# Patient Record
Sex: FEMALE | Race: WHITE | NOT HISPANIC OR LATINO | URBAN - METROPOLITAN AREA
[De-identification: names, ages, dates, MRNs, and addresses within clinical notes are randomized per-mention and may not be internally consistent; named-entity substitution may affect disease eponyms.]

---

## 2019-07-18 ENCOUNTER — APPOINTMENT (RX ONLY)
Dept: URBAN - METROPOLITAN AREA CLINIC 44 | Facility: CLINIC | Age: 54
Setting detail: DERMATOLOGY
End: 2019-07-18

## 2019-07-18 ENCOUNTER — APPOINTMENT (RX ONLY)
Dept: URBAN - METROPOLITAN AREA CLINIC 45 | Facility: CLINIC | Age: 54
Setting detail: DERMATOLOGY
End: 2019-07-18

## 2019-07-18 DIAGNOSIS — L71.8 OTHER ROSACEA: ICD-10-CM

## 2019-07-18 DIAGNOSIS — L21.8 OTHER SEBORRHEIC DERMATITIS: ICD-10-CM

## 2019-07-18 PROBLEM — R23.8 OTHER SKIN CHANGES: Status: ACTIVE | Noted: 2019-07-18

## 2019-07-18 PROCEDURE — 99203 OFFICE O/P NEW LOW 30 MIN: CPT

## 2019-07-18 PROCEDURE — ? OTHER

## 2019-07-18 PROCEDURE — ? PRESCRIPTION

## 2019-07-18 PROCEDURE — ? COUNSELING

## 2019-07-18 RX ORDER — SODIUM SULFACETAMIDE AND SULFUR 80; 40 MG/ML; MG/ML
1 LOTION TOPICAL QD
Qty: 1 | Refills: 2 | Status: ERX | COMMUNITY
Start: 2019-07-18

## 2019-07-18 RX ADMIN — SODIUM SULFACETAMIDE AND SULFUR 1: 80; 40 LOTION TOPICAL at 00:00

## 2019-07-18 ASSESSMENT — LOCATION ZONE DERM
LOCATION ZONE: FACE
LOCATION ZONE: FACE

## 2019-07-18 ASSESSMENT — LOCATION SIMPLE DESCRIPTION DERM
LOCATION SIMPLE: LEFT CHEEK
LOCATION SIMPLE: RIGHT CHEEK
LOCATION SIMPLE: RIGHT CHEEK
LOCATION SIMPLE: LEFT CHEEK

## 2019-07-18 ASSESSMENT — LOCATION DETAILED DESCRIPTION DERM
LOCATION DETAILED: LEFT SUPERIOR CENTRAL BUCCAL CHEEK
LOCATION DETAILED: RIGHT SUPERIOR MEDIAL BUCCAL CHEEK
LOCATION DETAILED: RIGHT SUPERIOR MEDIAL BUCCAL CHEEK
LOCATION DETAILED: RIGHT MEDIAL MALAR CHEEK
LOCATION DETAILED: LEFT INFERIOR CENTRAL MALAR CHEEK
LOCATION DETAILED: RIGHT INFERIOR CENTRAL MALAR CHEEK
LOCATION DETAILED: RIGHT MEDIAL MALAR CHEEK
LOCATION DETAILED: LEFT SUPERIOR CENTRAL BUCCAL CHEEK
LOCATION DETAILED: LEFT INFERIOR NASAL CHEEK
LOCATION DETAILED: RIGHT INFERIOR CENTRAL MALAR CHEEK
LOCATION DETAILED: LEFT INFERIOR NASAL CHEEK
LOCATION DETAILED: LEFT INFERIOR CENTRAL MALAR CHEEK

## 2019-07-18 ASSESSMENT — SEVERITY ASSESSMENT
HOW SEVERE IS THIS PATIENT'S CONDITION?: MODERATE
HOW SEVERE IS THIS PATIENT'S CONDITION?: MODERATE

## 2019-07-18 NOTE — PROCEDURE: OTHER
Other (Free Text): Patient's CC includes small, barely perceptible scars that arose on the forehead, cheeks, and mid cleavage about 1-2 weeks ago.  There are objectively really very subtle and I am not convinced I appreciated them.  They are very bothersome to her.  Her forehead and glabella are where the lesions are most concentrated. She says she got BOTOX injections in early June 2019 and went back to the medical spa, [facility], but they did not see anything.\\n\\nFamH:  Sister recently had similar problem and is taking ISOTRETINOIN for it; she wants to no if she should take that too?  (I told her it is not indicated for this condition.)\\n\\nI offered cosmetic consultation with Dr. Juan Ramon Whatley, but she declined that.  She wants to know why they are there.\\n\\nShe does report increased stress recently, as a mother, which may be contributing?\\n\\nI do note her relatively prominent seb derm - rosacea overlap on her NLFs and forehead and think that once this is better controlled with a SULFACLEANSE wash and gentle skin care (VANICREAM) products, her issue may improve in tandem.\\n\\nRTC 1 month.  Consider screening labs like CBC, CMP, and possibly ESTRELLITA with reflex.
Note Text (......Xxx Chief Complaint.): This diagnosis correlates with the
Detail Level: Zone

## 2019-07-18 NOTE — PROCEDURE: OTHER
Detail Level: Zone
Other (Free Text): Patient's CC includes small, barely perceptible scars that arose on the forehead, cheeks, and mid cleavage about 1-2 weeks ago.  There are objectively really very subtle and I am not convinced I appreciated them.  They are very bothersome to her.  Her forehead and glabella are where the lesions are most concentrated. She says she got BOTOX injections in early June 2019 and went back to the medical spa, [facility], but they did not see anything.\\n\\nFamH:  Sister recently had similar problem and is taking ISOTRETINOIN for it; she wants to no if she should take that too?  (I told her it is not indicated for this condition.)\\n\\nI offered cosmetic consultation with Dr. Juaquin Costa, but she declined that.  She wants to know why they are there.\\n\\nShe does report increased stress recently, as a mother, which may be contributing?\\n\\nI do note her relatively prominent seb derm - rosacea overlap on her NLFs and forehead and think that once this is better controlled with a SULFACLEANSE wash and gentle skin care (VANICREAM) products, her issue may improve in tandem.\\n\\nRTC 1 month.  Consider screening labs like CBC, CMP, and possibly BOOKER with reflex.
Note Text (......Xxx Chief Complaint.): This diagnosis correlates with the

## 2019-07-18 NOTE — HPI: SCAR
How Severe Is Your Scar?: mild
Is This A New Presentation, Or A Follow-Up?: Scars
Additional History: Patient presents to the clinic today to seek treatment for her scars on her face. Patient states most of the scarring was from acne in adolescence.

## 2019-07-22 ENCOUNTER — APPOINTMENT (RX ONLY)
Dept: URBAN - METROPOLITAN AREA CLINIC 44 | Facility: CLINIC | Age: 54
Setting detail: DERMATOLOGY
End: 2019-07-22

## 2019-07-22 ENCOUNTER — APPOINTMENT (RX ONLY)
Dept: URBAN - METROPOLITAN AREA CLINIC 45 | Facility: CLINIC | Age: 54
Setting detail: DERMATOLOGY
End: 2019-07-22

## 2019-07-22 DIAGNOSIS — B00.1 HERPESVIRAL VESICULAR DERMATITIS: ICD-10-CM

## 2019-07-22 PROBLEM — R23.8 OTHER SKIN CHANGES: Status: ACTIVE | Noted: 2019-07-22

## 2019-07-22 PROCEDURE — ? COUNSELING

## 2019-07-22 PROCEDURE — ? PRESCRIPTION

## 2019-07-22 PROCEDURE — ? OTHER

## 2019-07-22 PROCEDURE — 99213 OFFICE O/P EST LOW 20 MIN: CPT

## 2019-07-22 RX ORDER — VALACYCLOVIR 1 G/1
1 TABLET ORAL PRN
Qty: 12 | Refills: 1 | Status: ERX | COMMUNITY
Start: 2019-07-22

## 2019-07-22 RX ADMIN — VALACYCLOVIR 1: 1 TABLET ORAL at 00:00

## 2019-07-22 NOTE — PROCEDURE: OTHER
Other (Free Text): Patient's CC includes small, barely perceptible scars that arose on the forehead, cheeks, and mid cleavage about 1-2 weeks ago.  There are objectively really very subtle and I am not convinced I appreciated them.  They are very bothersome to her. Since I saw her for this 4 days ago, she went to the ER, urgent care, PCP, and specialist at [place].  She presents today requesting biopsy.  She says her daughters and  and neighbor as well as neighbor's child have similar lesions. Re: the lesions, she says, \"No offense, but men seem to have a hard time seeing them.\"\\n\\nFamH:  Sister recently had similar problem and is taking ISOTRETINOIN for it; she wants to no if she should take that too?  (I told her it is not indicated for this condition.)\\n\\nShe does report increased stress recently, as a mother, which may be contributing?\\n\\nROS is only (+) for diarrhea which seems to be improved at this point. \\n\\nI suppose it could be a resolving enterovirus eruption that I am unable to appreciate.  My DDx includes stress response. I told her I am not concerned about her skin and see nothing to be alarmed about.  If she develops any other symptoms, fever, or an obvious rash she should let me know.  There is nothing to biopsy at this time.\\n\\nHer PCP has apparently taken some blood tests, with results pending.\\n\\nRTC prn.
Note Text (......Xxx Chief Complaint.): This diagnosis correlates with the
Detail Level: Zone

## 2019-07-22 NOTE — PROCEDURE: OTHER
Note Text (......Xxx Chief Complaint.): This diagnosis correlates with the
Detail Level: Zone
Other (Free Text): Patient's CC includes small, barely perceptible scars that arose on the forehead, cheeks, and mid cleavage about 1-2 weeks ago.  There are objectively really very subtle and I am not convinced I appreciated them.  They are very bothersome to her. Since I saw her for this 4 days ago, she went to the ER, urgent care, PCP, and specialist at [place].  She presents today requesting biopsy.  She says her daughters and  and neighbor as well as neighbor's child have similar lesions. Re: the lesions, she says, \"No offense, but men seem to have a hard time seeing them.\"\\n\\nFamH:  Sister recently had similar problem and is taking ISOTRETINOIN for it; she wants to no if she should take that too?  (I told her it is not indicated for this condition.)\\n\\nShe does report increased stress recently, as a mother, which may be contributing?\\n\\nROS is only (+) for diarrhea which seems to be improved at this point. \\n\\nI suppose it could be a resolving enterovirus eruption that I am unable to appreciate.  My DDx includes stress response. I told her I am not concerned about her skin and see nothing to be alarmed about.  If she develops any other symptoms, fever, or an obvious rash she should let me know.  There is nothing to biopsy at this time.\\n\\nHer PCP has apparently taken some blood tests, with results pending.\\n\\nRTC prn.

## 2019-07-26 ENCOUNTER — APPOINTMENT (RX ONLY)
Dept: URBAN - METROPOLITAN AREA CLINIC 44 | Facility: CLINIC | Age: 54
Setting detail: DERMATOLOGY
End: 2019-07-26

## 2019-07-26 ENCOUNTER — APPOINTMENT (RX ONLY)
Dept: URBAN - METROPOLITAN AREA CLINIC 45 | Facility: CLINIC | Age: 54
Setting detail: DERMATOLOGY
End: 2019-07-26

## 2019-07-26 DIAGNOSIS — L30.9 DERMATITIS, UNSPECIFIED: ICD-10-CM

## 2019-07-26 PROCEDURE — ? BIOPSY BY PUNCH METHOD

## 2019-07-26 PROCEDURE — ? COUNSELING

## 2019-07-26 PROCEDURE — 11104 PUNCH BX SKIN SINGLE LESION: CPT

## 2019-07-26 PROCEDURE — ? ADDITIONAL NOTES

## 2019-07-26 PROCEDURE — ? ORDER TESTS

## 2019-07-26 ASSESSMENT — LOCATION ZONE DERM
LOCATION ZONE: ARM
LOCATION ZONE: ARM

## 2019-07-26 ASSESSMENT — LOCATION DETAILED DESCRIPTION DERM
LOCATION DETAILED: RIGHT DISTAL DORSAL FOREARM
LOCATION DETAILED: RIGHT DISTAL DORSAL FOREARM

## 2019-07-26 ASSESSMENT — LOCATION SIMPLE DESCRIPTION DERM
LOCATION SIMPLE: RIGHT FOREARM
LOCATION SIMPLE: RIGHT FOREARM

## 2019-07-26 NOTE — PROCEDURE: BIOPSY BY PUNCH METHOD
Epidermal Sutures: 4-0 Ethilon
Detail Level: Simple
X Depth Of Punch In Cm (Optional): 0
Bill 27316 For Specimen Handling/Conveyance To Laboratory?: no
Post-Care Instructions: I reviewed with the patient in detail post-care instructions. Patient is to keep the biopsy site dry overnight, and then apply bacitracin twice daily until healed. Patient may apply hydrogen peroxide soaks to remove any crusting.
Anesthesia Type: 1% lidocaine with epinephrine
Hemostasis: None
Suture Removal: 14 days
Consent: Written consent was obtained and risks were reviewed including but not limited to scarring, infection, bleeding, scabbing, incomplete removal, nerve damage and allergy to anesthesia.
Wound Care: Petrolatum
Notification Instructions: Patient will be notified of biopsy results. However, patient instructed to call the office if not contacted within 2 weeks.
Biopsy Type: H and E
Dressing: bandage
Was A Bandage Applied: Yes
Additional Anesthesia Volume In Cc (Will Not Render If 0): 2
Home Suture Removal Text: Patient was provided a home suture removal kit and will remove their sutures at home.  If they have any questions or difficulties they will call the office.
Punch Size In Mm: 4
Anesthesia Volume In Cc: 3.5
Billing Type: Third-Party Bill

## 2019-07-26 NOTE — PROCEDURE: ADDITIONAL NOTES
Additional Notes: This is my third time seeing this patient for subtle skin changes mainly on the face, upper chest, and arms for a few weeks.  She states the lesions are shaped like rosettes.  It is asymptomatic.  However, concern over its appearance has prompted several visits to PCP as well as urgent care/ER.  Patient's  has been seen here too, as this seems to be affected him as well as the patient's 4 children.  The patient reports watery / mucous-y diarrhea that occurs hourly for past 24 hours.  Low-grade fever of 99 F.  Today, as at other times, she is well-appearing and conversant.\\n\\nPMH:  PCP has run some tests, still pending, and prescribed VISTARIL for anxiety related to the condition, but the patient has not started that yet.\\n\\nI suppose it could be a resolving enterovirus eruption that I am having difficulty appreciating. Parvovirus or other viral exanthems may be very faint. My DDx includes illness anxiety disorder. I told her I am not concerned about her skin and see nothing to be alarmed about. However, she requests a biopsy today for reassurance.  I did have her marifer a good representative area on her R forearm for sampling with 4 mm punch.  I also swabbed her nares and pharynx for viral culture because I want to be thorough.\\n\\nRTC 2w for S/R and followup.
Detail Level: Detailed

## 2019-07-26 NOTE — PROCEDURE: ADDITIONAL NOTES
Detail Level: Detailed
Additional Notes: This is my third time seeing this patient for subtle skin changes mainly on the face, upper chest, and arms for a few weeks.  She states the lesions are shaped like rosettes.  It is asymptomatic.  However, concern over its appearance has prompted several visits to PCP as well as urgent care/ER.  Patient's  has been seen here too, as this seems to be affected him as well as the patient's 4 children.  The patient reports watery / mucous-y diarrhea that occurs hourly for past 24 hours.  Low-grade fever of 99 F.  Today, as at other times, she is well-appearing and conversant.\\n\\nPMH:  PCP has run some tests, still pending, and prescribed VISTARIL for anxiety related to the condition, but the patient has not started that yet.\\n\\nI suppose it could be a resolving enterovirus eruption that I am having difficulty appreciating. Parvovirus or other viral exanthems may be very faint. My DDx includes illness anxiety disorder. I told her I am not concerned about her skin and see nothing to be alarmed about. However, she requests a biopsy today for reassurance.  I did have her jacob a good representative area on her R forearm for sampling with 4 mm punch.  I also swabbed her nares and pharynx for viral culture because I want to be thorough.\\n\\nRTC 2w for S/R and followup.

## 2019-07-26 NOTE — PROCEDURE: BIOPSY BY PUNCH METHOD
Anesthesia Type: 1% lidocaine with epinephrine
Additional Anesthesia Volume In Cc (Will Not Render If 0): 2
Size Of Lesion In Cm (Optional): 0
Home Suture Removal Text: Patient was provided a home suture removal kit and will remove their sutures at home.  If they have any questions or difficulties they will call the office.
Punch Size In Mm: 4
Notification Instructions: Patient will be notified of biopsy results. However, patient instructed to call the office if not contacted within 2 weeks.
Detail Level: Simple
Suture Removal: 14 days
Consent: Written consent was obtained and risks were reviewed including but not limited to scarring, infection, bleeding, scabbing, incomplete removal, nerve damage and allergy to anesthesia.
Anesthesia Volume In Cc: 3.5
Wound Care: Petrolatum
Bill [id number] For Specimen Handling/Conveyance To Laboratory?: no
Billing Type: Third-Party Bill
Epidermal Sutures: 4-0 Ethilon
Hemostasis: None
Biopsy Type: H and E
Was A Bandage Applied: Yes
Lab: [id number]
Lab Facility: [id number]
Post-Care Instructions: I reviewed with the patient in detail post-care instructions. Patient is to keep the biopsy site dry overnight, and then apply bacitracin twice daily until healed. Patient may apply hydrogen peroxide soaks to remove any crusting.
Dressing: bandage

## 2019-07-26 NOTE — PROCEDURE: ORDER TESTS
Bill For Surgical Tray: no
Lab Facility: [id number]
Expected Date Of Service: 07/26/2019
Billing Type: Third-Party Bill
Performing Laboratory: [id number]

## 2019-08-09 ENCOUNTER — APPOINTMENT (RX ONLY)
Dept: URBAN - METROPOLITAN AREA CLINIC 44 | Facility: CLINIC | Age: 54
Setting detail: DERMATOLOGY
End: 2019-08-09

## 2019-08-09 ENCOUNTER — APPOINTMENT (RX ONLY)
Dept: URBAN - METROPOLITAN AREA CLINIC 45 | Facility: CLINIC | Age: 54
Setting detail: DERMATOLOGY
End: 2019-08-09

## 2019-08-09 DIAGNOSIS — D22 MELANOCYTIC NEVI: ICD-10-CM

## 2019-08-09 PROBLEM — D22.61 MELANOCYTIC NEVI OF RIGHT UPPER LIMB, INCLUDING SHOULDER: Status: ACTIVE | Noted: 2019-08-09

## 2019-08-09 PROBLEM — R23.8 OTHER SKIN CHANGES: Status: ACTIVE | Noted: 2019-08-09

## 2019-08-09 PROCEDURE — 99213 OFFICE O/P EST LOW 20 MIN: CPT

## 2019-08-09 PROCEDURE — ? COUNSELING

## 2019-08-09 PROCEDURE — ? OTHER

## 2019-08-09 ASSESSMENT — LOCATION DETAILED DESCRIPTION DERM
LOCATION DETAILED: RIGHT PROXIMAL LATERAL POSTERIOR UPPER ARM
LOCATION DETAILED: RIGHT ELBOW
LOCATION DETAILED: RIGHT PROXIMAL LATERAL POSTERIOR UPPER ARM
LOCATION DETAILED: RIGHT ELBOW

## 2019-08-09 ASSESSMENT — LOCATION SIMPLE DESCRIPTION DERM
LOCATION SIMPLE: RIGHT ELBOW
LOCATION SIMPLE: RIGHT ELBOW
LOCATION SIMPLE: RIGHT POSTERIOR UPPER ARM
LOCATION SIMPLE: RIGHT POSTERIOR UPPER ARM

## 2019-08-09 ASSESSMENT — LOCATION ZONE DERM
LOCATION ZONE: ARM
LOCATION ZONE: ARM

## 2019-08-09 NOTE — PROCEDURE: OTHER
Detail Level: Zone
Note Text (......Xxx Chief Complaint.): This diagnosis correlates with the
Other (Free Text): Patient's CC includes small, barely perceptible maculae that arose on face, trunk, and extremities about 1 month ago.  I have seen her several times for this complaint.  They are objectively really very subtle, and I am not convinced I appreciate them. Now she is complaining of headache that is distressing.  She reports all family members ( + 4 children) are similarly affected. She states that as the rash lesions resolve they leave behind brown macules (which appear to be benign melanocytic nevi).\\n\\nTesting I have performed:\\n(-) viral culture from nares and pharynx.\\nPunch biopsy 2 weeks ago from R forearm showed minor, non-specific findings.\\n\\n(Biopsy site healing well; sutures removed today.)\\n\\[place] this point, my DDx includes organic condition vs illness anxiety disorder.  I told her I am not concerned about her skin and see nothing to be alarmed about.  Her working diagnosis is varicella, based on lab testing that was done on herself and 1 of her daughters.  However, I did not review that testing personally.  The clinical exam is inconsistent with varicella.\\n\\nPatient states that her PCP is sending her to an infectious disease specialist in [place], so I encouraged the patient to please have that specialist's note faxed to me. I gave her a copy of the pathology report to take with her.\\n\\nRTC prn.

## 2019-08-09 NOTE — PROCEDURE: OTHER
Note Text (......Xxx Chief Complaint.): This diagnosis correlates with the
Detail Level: Zone
Other (Free Text): Patient's CC includes small, barely perceptible maculae that arose on face, trunk, and extremities about 1 month ago.  I have seen her several times for this complaint.  They are objectively really very subtle, and I am not convinced I appreciate them. Now she is complaining of headache that is distressing.  She reports all family members ( + 4 children) are similarly affected. She states that as the rash lesions resolve they leave behind brown macules (which appear to be benign melanocytic nevi).\\n\\nTesting I have performed:\\n(-) viral culture from nares and pharynx.\\nPunch biopsy 2 weeks ago from R forearm showed minor, non-specific findings.\\n\\n(Biopsy site healing well; sutures removed today.)\\n\\[place] this point, my DDx includes organic condition vs illness anxiety disorder.  I told her I am not concerned about her skin and see nothing to be alarmed about.  Her working diagnosis is varicella, based on lab testing that was done on herself and 1 of her daughters.  However, I did not review that testing personally.  The clinical exam is inconsistent with varicella.\\n\\nPatient states that her PCP is sending her to an infectious disease specialist in [place], so I encouraged the patient to please have that specialist's note faxed to me. I gave her a copy of the pathology report to take with her.\\n\\nRTC prn.